# Patient Record
Sex: MALE | Race: WHITE | NOT HISPANIC OR LATINO | Employment: OTHER | URBAN - METROPOLITAN AREA
[De-identification: names, ages, dates, MRNs, and addresses within clinical notes are randomized per-mention and may not be internally consistent; named-entity substitution may affect disease eponyms.]

---

## 2024-03-19 ENCOUNTER — HOSPITAL ENCOUNTER (EMERGENCY)
Facility: CLINIC | Age: 74
Discharge: HOME | End: 2024-03-19
Attending: EMERGENCY MEDICINE | Admitting: EMERGENCY MEDICINE
Payer: COMMERCIAL

## 2024-03-19 ENCOUNTER — APPOINTMENT (OUTPATIENT)
Dept: CT IMAGING | Facility: CLINIC | Age: 74
End: 2024-03-19
Attending: EMERGENCY MEDICINE
Payer: COMMERCIAL

## 2024-03-19 VITALS
OXYGEN SATURATION: 94 % | TEMPERATURE: 97.4 F | DIASTOLIC BLOOD PRESSURE: 79 MMHG | RESPIRATION RATE: 10 BRPM | SYSTOLIC BLOOD PRESSURE: 119 MMHG | HEART RATE: 89 BPM

## 2024-03-19 DIAGNOSIS — F10.929 ALCOHOLIC INTOXICATION WITH COMPLICATION (H): ICD-10-CM

## 2024-03-19 LAB
ALBUMIN SERPL BCG-MCNC: 4.5 G/DL (ref 3.5–5.2)
ALP SERPL-CCNC: 94 U/L (ref 40–150)
ALT SERPL W P-5'-P-CCNC: 84 U/L (ref 0–70)
ANION GAP SERPL CALCULATED.3IONS-SCNC: 13 MMOL/L (ref 7–15)
AST SERPL W P-5'-P-CCNC: 109 U/L (ref 0–45)
BASOPHILS # BLD AUTO: 0 10E3/UL (ref 0–0.2)
BASOPHILS NFR BLD AUTO: 0 %
BILIRUB SERPL-MCNC: 0.3 MG/DL
BUN SERPL-MCNC: 8.1 MG/DL (ref 8–23)
CALCIUM SERPL-MCNC: 8.9 MG/DL (ref 8.8–10.2)
CHLORIDE SERPL-SCNC: 97 MMOL/L (ref 98–107)
CREAT SERPL-MCNC: 1 MG/DL (ref 0.67–1.17)
DEPRECATED HCO3 PLAS-SCNC: 24 MMOL/L (ref 22–29)
EGFRCR SERPLBLD CKD-EPI 2021: 80 ML/MIN/1.73M2
EOSINOPHIL # BLD AUTO: 0.1 10E3/UL (ref 0–0.7)
EOSINOPHIL NFR BLD AUTO: 2 %
ERYTHROCYTE [DISTWIDTH] IN BLOOD BY AUTOMATED COUNT: 13.8 % (ref 10–15)
ETHANOL SERPL-MCNC: 0.37 G/DL
GLUCOSE SERPL-MCNC: 114 MG/DL (ref 70–99)
HCT VFR BLD AUTO: 41.1 % (ref 40–53)
HGB BLD-MCNC: 14.1 G/DL (ref 13.3–17.7)
HOLD SPECIMEN: NORMAL
IMM GRANULOCYTES # BLD: 0 10E3/UL
IMM GRANULOCYTES NFR BLD: 0 %
LIPASE SERPL-CCNC: 83 U/L (ref 13–60)
LYMPHOCYTES # BLD AUTO: 1.5 10E3/UL (ref 0.8–5.3)
LYMPHOCYTES NFR BLD AUTO: 29 %
MCH RBC QN AUTO: 31.3 PG (ref 26.5–33)
MCHC RBC AUTO-ENTMCNC: 34.3 G/DL (ref 31.5–36.5)
MCV RBC AUTO: 91 FL (ref 78–100)
MONOCYTES # BLD AUTO: 0.9 10E3/UL (ref 0–1.3)
MONOCYTES NFR BLD AUTO: 17 %
NEUTROPHILS # BLD AUTO: 2.7 10E3/UL (ref 1.6–8.3)
NEUTROPHILS NFR BLD AUTO: 52 %
NRBC # BLD AUTO: 0 10E3/UL
NRBC BLD AUTO-RTO: 0 /100
PLATELET # BLD AUTO: 260 10E3/UL (ref 150–450)
POTASSIUM SERPL-SCNC: 4 MMOL/L (ref 3.4–5.3)
PROT SERPL-MCNC: 8 G/DL (ref 6.4–8.3)
RBC # BLD AUTO: 4.5 10E6/UL (ref 4.4–5.9)
SODIUM SERPL-SCNC: 134 MMOL/L (ref 135–145)
WBC # BLD AUTO: 5.2 10E3/UL (ref 4–11)

## 2024-03-19 PROCEDURE — 70450 CT HEAD/BRAIN W/O DYE: CPT

## 2024-03-19 PROCEDURE — 85025 COMPLETE CBC W/AUTO DIFF WBC: CPT | Performed by: EMERGENCY MEDICINE

## 2024-03-19 PROCEDURE — 83690 ASSAY OF LIPASE: CPT | Performed by: EMERGENCY MEDICINE

## 2024-03-19 PROCEDURE — 80053 COMPREHEN METABOLIC PANEL: CPT | Performed by: EMERGENCY MEDICINE

## 2024-03-19 PROCEDURE — 99284 EMERGENCY DEPT VISIT MOD MDM: CPT | Mod: 25

## 2024-03-19 PROCEDURE — 82077 ASSAY SPEC XCP UR&BREATH IA: CPT | Performed by: EMERGENCY MEDICINE

## 2024-03-19 PROCEDURE — 36415 COLL VENOUS BLD VENIPUNCTURE: CPT | Performed by: EMERGENCY MEDICINE

## 2024-03-19 ASSESSMENT — ACTIVITIES OF DAILY LIVING (ADL)
ADLS_ACUITY_SCORE: 35
ADLS_ACUITY_SCORE: 35

## 2024-03-19 NOTE — ED NOTES
"Patient continues to talk to himself and sing song in the room, stating \"all the ladies want to  me, why is that\", sister says this is way more silly then them he is drunk and that he is goofy when he drinks but not like this.   "

## 2024-03-19 NOTE — ED TRIAGE NOTES
"Prior to this they were [place] the [employer] [place] and were she stated that he only had a drink and then stopped drinking and stated that he felt funny and started to slur his speech and feel off, chalking it up to sleep deprivation and alcohol.      Sister comes in with patient saying that he started \"45 minutes ago he started to act funny and was unable to recognizer her.  Neuros are intact, patient has no vision issues, no headache.  He is stating that he has had 5 large rum and coke, LARGE ones, endorses ETOH beliefs, sister says that he was slurring his speech, patient stated that he is drunk and that is why, sister also says that he has only drink.    "

## 2024-03-19 NOTE — ED PROVIDER NOTES
History     Chief Complaint:  Stroke Symptoms       HPI   Altaf Nixon is a 73 year old male presents from triage with concerns for possible stroke although patient states that he has been drinking too much for [employer] and feels that he is just intoxicated.  Sister is not here and the phone number on record is incomplete son waiting to get more information from her.  Patient notes that he drinks on a regular basis.  He had 5 rum and Cokes today that were double sized.  By report he did not recognize his sister and was acting silly.  Triage nurse did not notice any focal deficits but brought him back to evaluate for stroke symptoms.      Independent Historian:    Patient and sister    Review of External Notes:  Chart review    Medications:    No current outpatient medications on file.      Past Medical History:    No past medical history on file.    Past Surgical History:    No past surgical history on file.       Physical Exam   Patient Vitals for the past 24 hrs:   BP Temp Temp src Pulse Resp SpO2   03/19/24 1931 -- -- -- 89 -- --   03/19/24 1930 119/79 -- -- 91 -- --   03/19/24 1900 122/78 -- -- 91 10 94 %   03/19/24 1845 -- -- -- 92 20 98 %   03/19/24 1840 (!) 146/101 97.4  F (36.3  C) Axillary 96 17 96 %        Physical Exam  GENERAL: well developed, pleasant, jovial appears intoxicated  HEAD: atraumatic  EYES: pupils reactive, extraocular muscles intact, conjunctivae normal  ENT:  mucus membranes moist  NECK:  trachea midline, normal range of motion  RESPIRATORY: no tachypnea, breath sounds clear to auscultation   CVS: normal S1/S2, no murmurs, intact distal pulses  ABDOMEN: soft, nontender, nondistention  MUSCULOSKELETAL: no deformities  SKIN: warm and dry, no acute rashes or ulceration  NEURO: GCS 15, cranial nerves intact, alert and oriented x3, normal finger-to-nose  PSYCH:  Mood/affect normal      Emergency Department Course       Imaging:  CT Head w/o Contrast   Final Result    IMPRESSION:   1.  No acute intracranial process.          Laboratory:  Labs Ordered and Resulted from Time of ED Arrival to Time of ED Departure   COMPREHENSIVE METABOLIC PANEL - Abnormal       Result Value    Sodium 134 (*)     Potassium 4.0      Carbon Dioxide (CO2) 24      Anion Gap 13      Urea Nitrogen 8.1      Creatinine 1.00      GFR Estimate 80      Calcium 8.9      Chloride 97 (*)     Glucose 114 (*)     Alkaline Phosphatase 94       (*)     ALT 84 (*)     Protein Total 8.0      Albumin 4.5      Bilirubin Total 0.3     LIPASE - Abnormal    Lipase 83 (*)    ETHYL ALCOHOL LEVEL - Abnormal    Alcohol ethyl 0.37 (*)    CBC WITH PLATELETS AND DIFFERENTIAL    WBC Count 5.2      RBC Count 4.50      Hemoglobin 14.1      Hematocrit 41.1      MCV 91      MCH 31.3      MCHC 34.3      RDW 13.8      Platelet Count 260      % Neutrophils 52      % Lymphocytes 29      % Monocytes 17      % Eosinophils 2      % Basophils 0      % Immature Granulocytes 0      NRBCs per 100 WBC 0      Absolute Neutrophils 2.7      Absolute Lymphocytes 1.5      Absolute Monocytes 0.9      Absolute Eosinophils 0.1      Absolute Basophils 0.0      Absolute Immature Granulocytes 0.0      Absolute NRBCs 0.0          Procedures       Emergency Department Course & Assessments:    Interventions:  Medications - No data to display     Assessments:      Independent Interpretation (X-rays, CTs, rhythm strip):  None    Consultations/Discussion of Management or Tests:  None       Social Determinants of Health affecting care:  None     Disposition:  The patient was discharged.    Impression & Plan    CMS Diagnoses: None          Medical Decision Making:    Patient presents with sister who thinks she was having a stroke.  He had an episode where he did not recognize her and seemed off.  Patient notes that he has been drinking all day for same patties and is a chronic drinker and probably had too much to drink.  Here he has a nonfocal exam without  signs of a stroke.  Alcohol came back elevated at 0.37.  CT is negative for bleed or stroke.  He has a normal exam all looks to be consistent with alcohol intoxication do not feel we need to do perfusion scans or angio.    Critical Care time:  was 0 minutes for this patient excluding procedures.    Diagnosis:    ICD-10-CM    1. Alcoholic intoxication with complication (H24)  F10.929            Discharge Medications:  There are no discharge medications for this patient.         Héctor Castañeda MD  3/19/2024   Héctor Castañeda MD Adams, Shaun L, MD  03/19/24 5854

## 2024-05-22 ENCOUNTER — HOSPITAL ENCOUNTER (INPATIENT)
Facility: CLINIC | Age: 74
LOS: 2 days | Discharge: HOME | DRG: 897 | End: 2024-05-24
Attending: EMERGENCY MEDICINE | Admitting: HOSPITALIST
Payer: COMMERCIAL

## 2024-05-22 ENCOUNTER — OFFICE VISIT (OUTPATIENT)
Dept: URGENT CARE | Facility: URGENT CARE | Age: 74
End: 2024-05-22
Payer: COMMERCIAL

## 2024-05-22 VITALS
DIASTOLIC BLOOD PRESSURE: 103 MMHG | WEIGHT: 185 LBS | OXYGEN SATURATION: 97 % | TEMPERATURE: 99.6 F | HEART RATE: 125 BPM | SYSTOLIC BLOOD PRESSURE: 171 MMHG | RESPIRATION RATE: 20 BRPM

## 2024-05-22 DIAGNOSIS — T78.3XXA ANGIOEDEMA, INITIAL ENCOUNTER: ICD-10-CM

## 2024-05-22 DIAGNOSIS — E88.89 KETOSIS (H): ICD-10-CM

## 2024-05-22 DIAGNOSIS — I10 BENIGN ESSENTIAL HYPERTENSION: Primary | ICD-10-CM

## 2024-05-22 DIAGNOSIS — R56.9 ALCOHOL WITHDRAWAL SEIZURE WITHOUT COMPLICATION (H): ICD-10-CM

## 2024-05-22 DIAGNOSIS — R22.0 SWELLING OF BOTH LIPS: Primary | ICD-10-CM

## 2024-05-22 DIAGNOSIS — F10.930 ALCOHOL WITHDRAWAL SEIZURE WITHOUT COMPLICATION (H): ICD-10-CM

## 2024-05-22 LAB
ABO/RH(D): NORMAL
ALBUMIN SERPL BCG-MCNC: 4.5 G/DL (ref 3.5–5.2)
ALP SERPL-CCNC: 96 U/L (ref 40–150)
ALT SERPL W P-5'-P-CCNC: 90 U/L (ref 0–70)
ANION GAP SERPL CALCULATED.3IONS-SCNC: 20 MMOL/L (ref 7–15)
ANTIBODY SCREEN: NEGATIVE
AST SERPL W P-5'-P-CCNC: 126 U/L (ref 0–45)
ATRIAL RATE - MUSE: 114 BPM
B-OH-BUTYR SERPL-SCNC: 1.9 MMOL/L
BASOPHILS # BLD AUTO: 0 10E3/UL (ref 0–0.2)
BASOPHILS NFR BLD AUTO: 0 %
BILIRUB SERPL-MCNC: 0.8 MG/DL
BLD PROD TYP BPU: NORMAL
BLOOD COMPONENT TYPE: NORMAL
BUN SERPL-MCNC: 7 MG/DL (ref 8–23)
CALCIUM SERPL-MCNC: 9.7 MG/DL (ref 8.8–10.2)
CHLORIDE SERPL-SCNC: 93 MMOL/L (ref 98–107)
CODING SYSTEM: NORMAL
CREAT SERPL-MCNC: 0.91 MG/DL (ref 0.67–1.17)
DEPRECATED HCO3 PLAS-SCNC: 18 MMOL/L (ref 22–29)
DIASTOLIC BLOOD PRESSURE - MUSE: NORMAL MMHG
EGFRCR SERPLBLD CKD-EPI 2021: 90 ML/MIN/1.73M2
EOSINOPHIL # BLD AUTO: 0 10E3/UL (ref 0–0.7)
EOSINOPHIL NFR BLD AUTO: 0 %
ERYTHROCYTE [DISTWIDTH] IN BLOOD BY AUTOMATED COUNT: 13.3 % (ref 10–15)
ETHANOL SERPL-MCNC: 0.01 G/DL
GLUCOSE BLDC GLUCOMTR-MCNC: 162 MG/DL (ref 70–99)
GLUCOSE SERPL-MCNC: 101 MG/DL (ref 70–99)
HCT VFR BLD AUTO: 45.1 % (ref 40–53)
HGB BLD-MCNC: 15.6 G/DL (ref 13.3–17.7)
IMM GRANULOCYTES # BLD: 0 10E3/UL
IMM GRANULOCYTES NFR BLD: 0 %
INTERPRETATION ECG - MUSE: NORMAL
ISSUE DATE AND TIME: NORMAL
LYMPHOCYTES # BLD AUTO: 1 10E3/UL (ref 0.8–5.3)
LYMPHOCYTES NFR BLD AUTO: 17 %
MAGNESIUM SERPL-MCNC: 1.8 MG/DL (ref 1.7–2.3)
MAGNESIUM SERPL-MCNC: 1.8 MG/DL (ref 1.7–2.3)
MCH RBC QN AUTO: 32.2 PG (ref 26.5–33)
MCHC RBC AUTO-ENTMCNC: 34.6 G/DL (ref 31.5–36.5)
MCV RBC AUTO: 93 FL (ref 78–100)
MONOCYTES # BLD AUTO: 0.6 10E3/UL (ref 0–1.3)
MONOCYTES NFR BLD AUTO: 11 %
NEUTROPHILS # BLD AUTO: 4 10E3/UL (ref 1.6–8.3)
NEUTROPHILS NFR BLD AUTO: 72 %
NRBC # BLD AUTO: 0 10E3/UL
NRBC BLD AUTO-RTO: 0 /100
P AXIS - MUSE: 66 DEGREES
PHOSPHATE SERPL-MCNC: 2.5 MG/DL (ref 2.5–4.5)
PLATELET # BLD AUTO: 238 10E3/UL (ref 150–450)
POTASSIUM SERPL-SCNC: 3.9 MMOL/L (ref 3.4–5.3)
PR INTERVAL - MUSE: 142 MS
PROT SERPL-MCNC: 8.3 G/DL (ref 6.4–8.3)
QRS DURATION - MUSE: 96 MS
QT - MUSE: 322 MS
QTC - MUSE: 443 MS
R AXIS - MUSE: 2 DEGREES
RBC # BLD AUTO: 4.85 10E6/UL (ref 4.4–5.9)
SODIUM SERPL-SCNC: 131 MMOL/L (ref 135–145)
SPECIMEN EXPIRATION DATE: NORMAL
SYSTOLIC BLOOD PRESSURE - MUSE: NORMAL MMHG
T AXIS - MUSE: 63 DEGREES
TSH SERPL DL<=0.005 MIU/L-ACNC: 3.88 UIU/ML (ref 0.3–4.2)
UNIT ABO/RH: NORMAL
UNIT NUMBER: NORMAL
UNIT STATUS: NORMAL
UNIT TYPE ISBT: 6200
VENTRICULAR RATE- MUSE: 114 BPM
WBC # BLD AUTO: 5.7 10E3/UL (ref 4–11)

## 2024-05-22 PROCEDURE — 36430 TRANSFUSION BLD/BLD COMPNT: CPT

## 2024-05-22 PROCEDURE — 250N000009 HC RX 250: Performed by: EMERGENCY MEDICINE

## 2024-05-22 PROCEDURE — HZ2ZZZZ DETOXIFICATION SERVICES FOR SUBSTANCE ABUSE TREATMENT: ICD-10-PCS | Performed by: HOSPITALIST

## 2024-05-22 PROCEDURE — 36415 COLL VENOUS BLD VENIPUNCTURE: CPT | Performed by: HOSPITALIST

## 2024-05-22 PROCEDURE — P9059 PLASMA, FRZ BETWEEN 8-24HOUR: HCPCS | Performed by: EMERGENCY MEDICINE

## 2024-05-22 PROCEDURE — 99285 EMERGENCY DEPT VISIT HI MDM: CPT | Mod: 25

## 2024-05-22 PROCEDURE — 250N000013 HC RX MED GY IP 250 OP 250 PS 637: Performed by: EMERGENCY MEDICINE

## 2024-05-22 PROCEDURE — 96374 THER/PROPH/DIAG INJ IV PUSH: CPT

## 2024-05-22 PROCEDURE — 258N000003 HC RX IP 258 OP 636: Performed by: EMERGENCY MEDICINE

## 2024-05-22 PROCEDURE — 36415 COLL VENOUS BLD VENIPUNCTURE: CPT | Performed by: EMERGENCY MEDICINE

## 2024-05-22 PROCEDURE — 85025 COMPLETE CBC W/AUTO DIFF WBC: CPT | Performed by: EMERGENCY MEDICINE

## 2024-05-22 PROCEDURE — 93005 ELECTROCARDIOGRAM TRACING: CPT

## 2024-05-22 PROCEDURE — 82010 KETONE BODYS QUAN: CPT | Performed by: EMERGENCY MEDICINE

## 2024-05-22 PROCEDURE — 3E043XZ INTRODUCTION OF VASOPRESSOR INTO CENTRAL VEIN, PERCUTANEOUS APPROACH: ICD-10-PCS | Performed by: HOSPITALIST

## 2024-05-22 PROCEDURE — 84443 ASSAY THYROID STIM HORMONE: CPT | Performed by: EMERGENCY MEDICINE

## 2024-05-22 PROCEDURE — 96376 TX/PRO/DX INJ SAME DRUG ADON: CPT

## 2024-05-22 PROCEDURE — 83735 ASSAY OF MAGNESIUM: CPT | Performed by: EMERGENCY MEDICINE

## 2024-05-22 PROCEDURE — 80053 COMPREHEN METABOLIC PANEL: CPT | Performed by: EMERGENCY MEDICINE

## 2024-05-22 PROCEDURE — 96372 THER/PROPH/DIAG INJ SC/IM: CPT | Mod: 59 | Performed by: PHYSICIAN ASSISTANT

## 2024-05-22 PROCEDURE — 86900 BLOOD TYPING SEROLOGIC ABO: CPT | Performed by: EMERGENCY MEDICINE

## 2024-05-22 PROCEDURE — 250N000011 HC RX IP 250 OP 636: Performed by: EMERGENCY MEDICINE

## 2024-05-22 PROCEDURE — 96361 HYDRATE IV INFUSION ADD-ON: CPT

## 2024-05-22 PROCEDURE — 258N000003 HC RX IP 258 OP 636: Performed by: HOSPITALIST

## 2024-05-22 PROCEDURE — 96375 TX/PRO/DX INJ NEW DRUG ADDON: CPT

## 2024-05-22 PROCEDURE — 99222 1ST HOSP IP/OBS MODERATE 55: CPT | Performed by: HOSPITALIST

## 2024-05-22 PROCEDURE — 82077 ASSAY SPEC XCP UR&BREATH IA: CPT | Performed by: EMERGENCY MEDICINE

## 2024-05-22 PROCEDURE — 120N000013 HC R&B IMCU

## 2024-05-22 PROCEDURE — 99205 OFFICE O/P NEW HI 60 MIN: CPT | Mod: 25 | Performed by: PHYSICIAN ASSISTANT

## 2024-05-22 PROCEDURE — 83735 ASSAY OF MAGNESIUM: CPT | Performed by: HOSPITALIST

## 2024-05-22 PROCEDURE — 250N000013 HC RX MED GY IP 250 OP 250 PS 637: Performed by: HOSPITALIST

## 2024-05-22 PROCEDURE — 84100 ASSAY OF PHOSPHORUS: CPT | Performed by: HOSPITALIST

## 2024-05-22 PROCEDURE — 250N000011 HC RX IP 250 OP 636: Performed by: HOSPITALIST

## 2024-05-22 RX ORDER — DIAZEPAM 10 MG/2ML
5 INJECTION, SOLUTION INTRAMUSCULAR; INTRAVENOUS ONCE
Status: COMPLETED | OUTPATIENT
Start: 2024-05-22 | End: 2024-05-22

## 2024-05-22 RX ORDER — FOLIC ACID 1 MG/1
1 TABLET ORAL DAILY
Status: DISCONTINUED | OUTPATIENT
Start: 2024-05-23 | End: 2024-05-24 | Stop reason: HOSPADM

## 2024-05-22 RX ORDER — ACETAMINOPHEN 650 MG/1
650 SUPPOSITORY RECTAL EVERY 4 HOURS PRN
Status: DISCONTINUED | OUTPATIENT
Start: 2024-05-22 | End: 2024-05-24 | Stop reason: HOSPADM

## 2024-05-22 RX ORDER — HALOPERIDOL 5 MG/ML
2.5-5 INJECTION INTRAMUSCULAR EVERY 6 HOURS PRN
Status: DISCONTINUED | OUTPATIENT
Start: 2024-05-22 | End: 2024-05-24 | Stop reason: HOSPADM

## 2024-05-22 RX ORDER — DIAZEPAM 5 MG
10 TABLET ORAL EVERY 30 MIN PRN
Status: DISCONTINUED | OUTPATIENT
Start: 2024-05-22 | End: 2024-05-24 | Stop reason: HOSPADM

## 2024-05-22 RX ORDER — MULTIPLE VITAMINS W/ MINERALS TAB 9MG-400MCG
1 TAB ORAL DAILY
Status: DISCONTINUED | OUTPATIENT
Start: 2024-05-23 | End: 2024-05-24 | Stop reason: HOSPADM

## 2024-05-22 RX ORDER — MULTIPLE VITAMINS W/ MINERALS TAB 9MG-400MCG
1 TAB ORAL DAILY
Status: DISCONTINUED | OUTPATIENT
Start: 2024-05-22 | End: 2024-05-22

## 2024-05-22 RX ORDER — CALCIUM CARBONATE 500 MG/1
1000 TABLET, CHEWABLE ORAL 4 TIMES DAILY PRN
Status: DISCONTINUED | OUTPATIENT
Start: 2024-05-22 | End: 2024-05-24 | Stop reason: HOSPADM

## 2024-05-22 RX ORDER — AMOXICILLIN 250 MG
1 CAPSULE ORAL 2 TIMES DAILY PRN
Status: DISCONTINUED | OUTPATIENT
Start: 2024-05-22 | End: 2024-05-24 | Stop reason: HOSPADM

## 2024-05-22 RX ORDER — SODIUM CHLORIDE 9 MG/ML
INJECTION, SOLUTION INTRAVENOUS CONTINUOUS
Status: DISCONTINUED | OUTPATIENT
Start: 2024-05-22 | End: 2024-05-24 | Stop reason: HOSPADM

## 2024-05-22 RX ORDER — FOLIC ACID 1 MG/1
1 TABLET ORAL DAILY
Status: DISCONTINUED | OUTPATIENT
Start: 2024-05-22 | End: 2024-05-22

## 2024-05-22 RX ORDER — AMOXICILLIN 250 MG
2 CAPSULE ORAL 2 TIMES DAILY PRN
Status: DISCONTINUED | OUTPATIENT
Start: 2024-05-22 | End: 2024-05-24 | Stop reason: HOSPADM

## 2024-05-22 RX ORDER — FLUMAZENIL 0.1 MG/ML
0.2 INJECTION, SOLUTION INTRAVENOUS
Status: DISCONTINUED | OUTPATIENT
Start: 2024-05-22 | End: 2024-05-24 | Stop reason: HOSPADM

## 2024-05-22 RX ORDER — LIDOCAINE 40 MG/G
CREAM TOPICAL
Status: DISCONTINUED | OUTPATIENT
Start: 2024-05-22 | End: 2024-05-22

## 2024-05-22 RX ORDER — DIAZEPAM 10 MG/2ML
5-10 INJECTION, SOLUTION INTRAMUSCULAR; INTRAVENOUS EVERY 30 MIN PRN
Status: DISCONTINUED | OUTPATIENT
Start: 2024-05-22 | End: 2024-05-22

## 2024-05-22 RX ORDER — SIMVASTATIN 20 MG
20 TABLET ORAL AT BEDTIME
Status: ON HOLD | COMMUNITY
Start: 2023-08-21 | End: 2024-05-24

## 2024-05-22 RX ORDER — DIAZEPAM 10 MG/2ML
5-10 INJECTION, SOLUTION INTRAMUSCULAR; INTRAVENOUS EVERY 30 MIN PRN
Status: DISCONTINUED | OUTPATIENT
Start: 2024-05-22 | End: 2024-05-24 | Stop reason: HOSPADM

## 2024-05-22 RX ORDER — METHYLPREDNISOLONE SODIUM SUCCINATE 125 MG/2ML
60 INJECTION, POWDER, LYOPHILIZED, FOR SOLUTION INTRAMUSCULAR; INTRAVENOUS ONCE
Status: COMPLETED | OUTPATIENT
Start: 2024-05-22 | End: 2024-05-22

## 2024-05-22 RX ORDER — MULTIVIT WITH MINERALS/LUTEIN
1 TABLET ORAL DAILY
COMMUNITY

## 2024-05-22 RX ORDER — ACETAMINOPHEN 325 MG/1
650 TABLET ORAL EVERY 4 HOURS PRN
Status: DISCONTINUED | OUTPATIENT
Start: 2024-05-22 | End: 2024-05-24 | Stop reason: HOSPADM

## 2024-05-22 RX ORDER — LIDOCAINE 40 MG/G
CREAM TOPICAL
Status: DISCONTINUED | OUTPATIENT
Start: 2024-05-22 | End: 2024-05-24 | Stop reason: HOSPADM

## 2024-05-22 RX ORDER — DIPHENHYDRAMINE HYDROCHLORIDE 50 MG/ML
50 INJECTION INTRAMUSCULAR; INTRAVENOUS ONCE
Status: DISCONTINUED | OUTPATIENT
Start: 2024-05-22 | End: 2024-05-22

## 2024-05-22 RX ORDER — OLANZAPINE 5 MG/1
5-10 TABLET, ORALLY DISINTEGRATING ORAL EVERY 6 HOURS PRN
Status: DISCONTINUED | OUTPATIENT
Start: 2024-05-22 | End: 2024-05-24 | Stop reason: HOSPADM

## 2024-05-22 RX ORDER — DIAZEPAM 5 MG
10 TABLET ORAL EVERY 30 MIN PRN
Status: DISCONTINUED | OUTPATIENT
Start: 2024-05-22 | End: 2024-05-22

## 2024-05-22 RX ORDER — AMLODIPINE BESYLATE AND BENAZEPRIL HYDROCHLORIDE 2.5; 1 MG/1; MG/1
1 CAPSULE ORAL DAILY
Status: ON HOLD | COMMUNITY
End: 2024-05-23

## 2024-05-22 RX ORDER — METHYLPREDNISOLONE SODIUM SUCCINATE 40 MG/ML
40 INJECTION, POWDER, LYOPHILIZED, FOR SOLUTION INTRAMUSCULAR; INTRAVENOUS ONCE
Status: DISCONTINUED | OUTPATIENT
Start: 2024-05-22 | End: 2024-05-22

## 2024-05-22 RX ORDER — CLONIDINE HYDROCHLORIDE 0.1 MG/1
0.1 TABLET ORAL EVERY 8 HOURS
Status: DISCONTINUED | OUTPATIENT
Start: 2024-05-22 | End: 2024-05-24 | Stop reason: HOSPADM

## 2024-05-22 RX ORDER — TRANEXAMIC ACID 10 MG/ML
1 INJECTION, SOLUTION INTRAVENOUS ONCE
Status: COMPLETED | OUTPATIENT
Start: 2024-05-22 | End: 2024-05-22

## 2024-05-22 RX ADMIN — SODIUM CHLORIDE 1000 ML: 9 INJECTION, SOLUTION INTRAVENOUS at 20:00

## 2024-05-22 RX ADMIN — DIPHENHYDRAMINE HYDROCHLORIDE 50 MG: 50 INJECTION INTRAMUSCULAR; INTRAVENOUS at 17:04

## 2024-05-22 RX ADMIN — SODIUM CHLORIDE 1000 ML: 9 INJECTION, SOLUTION INTRAVENOUS at 17:52

## 2024-05-22 RX ADMIN — EPINEPHRINE 0.3 MG: 1 INJECTION, SOLUTION, CONCENTRATE INTRAVENOUS at 17:49

## 2024-05-22 RX ADMIN — METHYLPREDNISOLONE SODIUM SUCCINATE 40 MG: 40 INJECTION, POWDER, LYOPHILIZED, FOR SOLUTION INTRAMUSCULAR; INTRAVENOUS at 17:08

## 2024-05-22 RX ADMIN — DIAZEPAM 5 MG: 5 INJECTION INTRAMUSCULAR; INTRAVENOUS at 18:10

## 2024-05-22 RX ADMIN — METHYLPREDNISOLONE SODIUM SUCCINATE 62.5 MG: 125 INJECTION, POWDER, FOR SOLUTION INTRAMUSCULAR; INTRAVENOUS at 17:46

## 2024-05-22 RX ADMIN — DIAZEPAM 5 MG: 5 INJECTION INTRAMUSCULAR; INTRAVENOUS at 18:38

## 2024-05-22 RX ADMIN — SODIUM CHLORIDE: 9 INJECTION, SOLUTION INTRAVENOUS at 22:54

## 2024-05-22 RX ADMIN — MULTIPLE VITAMINS W/ MINERALS TAB 1 TABLET: TAB at 19:37

## 2024-05-22 RX ADMIN — CLONIDINE HYDROCHLORIDE 0.1 MG: 0.1 TABLET ORAL at 21:35

## 2024-05-22 RX ADMIN — DIAZEPAM 5 MG: 5 INJECTION INTRAMUSCULAR; INTRAVENOUS at 17:42

## 2024-05-22 RX ADMIN — THIAMINE HCL TAB 100 MG 100 MG: 100 TAB at 19:37

## 2024-05-22 RX ADMIN — DIAZEPAM 10 MG: 5 INJECTION INTRAMUSCULAR; INTRAVENOUS at 19:42

## 2024-05-22 RX ADMIN — FOLIC ACID 1 MG: 1 TABLET ORAL at 19:37

## 2024-05-22 RX ADMIN — DIAZEPAM 10 MG: 5 INJECTION INTRAMUSCULAR; INTRAVENOUS at 21:36

## 2024-05-22 RX ADMIN — DIAZEPAM 10 MG: 5 TABLET ORAL at 23:04

## 2024-05-22 RX ADMIN — TRANEXAMIC ACID 1 G: 10 INJECTION, SOLUTION INTRAVENOUS at 17:54

## 2024-05-22 ASSESSMENT — LIFESTYLE VARIABLES
HEADACHE, FULLNESS IN HEAD: NOT PRESENT
ORIENTATION AND CLOUDING OF SENSORIUM: ORIENTED AND CAN DO SERIAL ADDITIONS
TACTILE DISTURBANCES: MILD ITCHING, PINS AND NEEDLES, BURNING OR NUMBNESS
ANXIETY: MILDLY ANXIOUS
TREMOR: MODERATE, WITH PATIENT'S ARMS EXTENDED
TOTAL SCORE: 7
AGITATION: SOMEWHAT MORE THAN NORMAL ACTIVITY
TREMOR: 6
HEADACHE, FULLNESS IN HEAD: NOT PRESENT
TOTAL SCORE: 12
AUDITORY DISTURBANCES: NOT PRESENT
AUDITORY DISTURBANCES: MILD HARSHNESS OR ABILITY TO FRIGHTEN
AGITATION: 2
VISUAL DISTURBANCES: NOT PRESENT
ANXIETY: MODERATELY ANXIOUS, OR GUARDED, SO ANXIETY IS INFERRED
ORIENTATION AND CLOUDING OF SENSORIUM: ORIENTED AND CAN DO SERIAL ADDITIONS
TACTILE DISTURBANCES: VERY MILD ITCHING, PINS AND NEEDLES, BURNING OR NUMBNESS
AUDITORY DISTURBANCES: NOT PRESENT
AGITATION: NORMAL ACTIVITY
VISUAL DISTURBANCES: NOT PRESENT
VISUAL DISTURBANCES: NOT PRESENT
TREMOR: MODERATE, WITH PATIENT'S ARMS EXTENDED
NAUSEA AND VOMITING: MILD NAUSEA WITH NO VOMITING
ORIENTATION AND CLOUDING OF SENSORIUM: ORIENTED AND CAN DO SERIAL ADDITIONS
HEADACHE, FULLNESS IN HEAD: NOT PRESENT
TOTAL SCORE: 8
ANXIETY: 3
NAUSEA AND VOMITING: NO NAUSEA AND NO VOMITING
TOTAL SCORE: 10
ORIENTATION AND CLOUDING OF SENSORIUM: ORIENTED AND CAN DO SERIAL ADDITIONS
HEADACHE, FULLNESS IN HEAD: NOT PRESENT
NAUSEA AND VOMITING: NO NAUSEA AND NO VOMITING
VISUAL DISTURBANCES: NOT PRESENT
AGITATION: NORMAL ACTIVITY
TOTAL SCORE: 16
AGITATION: SOMEWHAT MORE THAN NORMAL ACTIVITY
PAROXYSMAL SWEATS: BARELY PERCEPTIBLE SWEATING, PALMS MOIST
PAROXYSMAL SWEATS: 3
AUDITORY DISTURBANCES: NOT PRESENT
AGITATION: SOMEWHAT MORE THAN NORMAL ACTIVITY
TOTAL SCORE: 13
NAUSEA AND VOMITING: NO NAUSEA AND NO VOMITING
ANXIETY: 3
PAROXYSMAL SWEATS: NO SWEAT VISIBLE
HEADACHE, FULLNESS IN HEAD: NOT PRESENT
PAROXYSMAL SWEATS: BARELY PERCEPTIBLE SWEATING, PALMS MOIST
NAUSEA AND VOMITING: NO NAUSEA AND NO VOMITING
PAROXYSMAL SWEATS: NO SWEAT VISIBLE
TREMOR: MODERATE, WITH PATIENT'S ARMS EXTENDED
AUDITORY DISTURBANCES: NOT PRESENT
VISUAL DISTURBANCES: NOT PRESENT
HEADACHE, FULLNESS IN HEAD: NOT PRESENT
ANXIETY: 3
ORIENTATION AND CLOUDING OF SENSORIUM: ORIENTED AND CAN DO SERIAL ADDITIONS
NAUSEA AND VOMITING: NO NAUSEA AND NO VOMITING
ANXIETY: 2
TACTILE DISTURBANCES: VERY MILD ITCHING, PINS AND NEEDLES, BURNING OR NUMBNESS
VISUAL DISTURBANCES: NOT PRESENT
TREMOR: 5
ORIENTATION AND CLOUDING OF SENSORIUM: ORIENTED AND CAN DO SERIAL ADDITIONS
TACTILE DISTURBANCES: MILD ITCHING, PINS AND NEEDLES, BURNING OR NUMBNESS
AUDITORY DISTURBANCES: VERY MILD HARSHNESS OR ABILITY TO FRIGHTEN
PAROXYSMAL SWEATS: BARELY PERCEPTIBLE SWEATING, PALMS MOIST
TREMOR: 6

## 2024-05-22 ASSESSMENT — COLUMBIA-SUICIDE SEVERITY RATING SCALE - C-SSRS
1. IN THE PAST MONTH, HAVE YOU WISHED YOU WERE DEAD OR WISHED YOU COULD GO TO SLEEP AND NOT WAKE UP?: NO
6. HAVE YOU EVER DONE ANYTHING, STARTED TO DO ANYTHING, OR PREPARED TO DO ANYTHING TO END YOUR LIFE?: NO
2. HAVE YOU ACTUALLY HAD ANY THOUGHTS OF KILLING YOURSELF IN THE PAST MONTH?: NO

## 2024-05-22 ASSESSMENT — ACTIVITIES OF DAILY LIVING (ADL)
ADLS_ACUITY_SCORE: 35

## 2024-05-22 NOTE — PROGRESS NOTES
Clinic Administered Medication Documentation        Patient was given Diphenhydramine 50mg and Methylprednisolone 40mg in L anterior thigh. Prior to medication administration, verified patient's identity using patient s name and date of birth. Please see MAR and medication order for additional information. Patient went to ER via ambulance.    Vial/Syringe: Single dose vial. Was entire vial of medication used? Yes    Altaf Eagle RN BSN  [facility]

## 2024-05-22 NOTE — PROGRESS NOTES
Nurse roomed patient and notified provider of patient's condition. Provider Rico DAVIES assessed the patient and decided to send the patient by ambulance to the ER.     Altaf Eagle RN BSN  [facility]

## 2024-05-22 NOTE — PROGRESS NOTES
SUBJECTIVE:  Altaf Nixon is a 73 year old male who presents to the clinic today for swollen lips.  Onset of rash was 6 hours ago   Rash is gradual onset.  Location of the rash: lips.  Symptoms are moderate and rash seems to be worsening.  Previous history of a similar rash? No  Recent exposure history: Benazepril    Associated symptoms include: nothing.    No past medical history on file.  Current Outpatient Medications   Medication Sig Dispense Refill    amLODIPine-benazepril (LOTREL) 2.5-10 MG capsule Take 1 capsule by mouth daily      simvastatin (ZOCOR) 20 MG tablet 20 mg       Social History     Tobacco Use    Smoking status: Never    Smokeless tobacco: Never   Substance Use Topics    Alcohol use: Not on file       ROS:  Review of systems negative except as stated above.    EXAM:   BP (!) 171/103 (BP Location: Right arm, Patient Position: Sitting, Cuff Size: Adult Regular)   Pulse (!) 125   Temp 99.6  F (37.6  C) (Tympanic)   Resp 20   Wt 83.9 kg (185 lb)   SpO2 97%   GENERAL: alert, no acute distress.  ENT: Upper and lower lips significantly swollen  RESP: lungs clear to auscultation - no rales, rhonchi or wheezes  CV: regular rates and rhythm, normal S1 S2, no murmur noted  NEURO: Normal strength and tone, sensory exam grossly normal,  normal speech and mentation    ASSESSMENT:  (R22.0) Swelling of both lips  (primary encounter diagnosis)  (T78.3XXA) Angioedema, initial encounter  Comment: possibly response to medication  Plan: methylPREDNISolone sodium succinate         (solu-MEDROL) injection 40 mg, diphenhydrAMINE         (BENADRYL) injection 50 mg, INJECTION         INTRAMUSCULAR OR SUB-Q  To ED for monitoring via EMS

## 2024-05-22 NOTE — ED NOTES
Bed: ED19  Expected date:   Expected time:   Means of arrival:   Comments:  Frank Swain angiodema eta 6

## 2024-05-22 NOTE — ED PROVIDER NOTES
Emergency Department Note      History of Present Illness     Chief Complaint  Allergic Reaction    HPI  Altaf Nixon is a 73 year old male who presents to the ED via EMS for evaluation of an allergic reaction. The EMS reports that the patient woke up this morning with redness and swelling that began on his cheek and later spread to his whole face. Adds that the patient went to get Benadryl at the pharmacy earlier today, where he was directed to go to . When at  the patient was given  Benadryl and solu-Medrol IM around 1700, and brought via EMS to the ED. When at  his heart rate was 125 beats per minute, and between 110-120 beats per minute en route. The patient reports that he typically drinks about four drinks per day. The patient endorses eating a taco this morning that was left over from the prior night. The patient denies difficulty breathing, swallowing, GI symptoms, wheezing, or shortness of breath.     Independent Historian  EMS as detailed above.    Review of External Notes  I reviewed the office visit from Dr. Amin at urgent care.     Past Medical History   Medical History and Problem List  Chronic eczema   Diverticular disease, large intestine   Hypertension  Colonic polyps, with high grade dysplasia   High cholesterol       Medications  Lotrel   Zocor    Surgical History   No pertinent surgical history.     Physical Exam   Patient Vitals for the past 24 hrs:   BP Temp Temp src Pulse Resp SpO2   05/22/24 1900 (!) 152/95 -- -- (!) 123 25 92 %   05/22/24 1840 (!) 155/85 -- -- 118 -- 97 %   05/22/24 1839 -- 98.1  F (36.7  C) -- -- -- --   05/22/24 1830 (!) 160/90 -- -- 120 17 96 %   05/22/24 1806 (!) 157/94 -- -- (!) 123 21 97 %   05/22/24 1745 (!) 144/103 -- -- 116 19 98 %   05/22/24 1734 (!) 170/99 -- -- 118 13 97 %   05/22/24 1730 (!) 174/102 97.3  F (36.3  C) Oral (!) 130 16 96 %     Physical Exam  VS: Reviewed per above  HENT: Mucous membranes moist. Uvula midline, no tonsillar hypertrophy  nor asymmetry. Tolerating secretions, normal phonation. No nuchal rigidity.  There is evidence of moderate upper lip swelling.  No tongue swelling.  EYES: sclera anicteric  CV: Rate as noted, regular rhythm.   RESP: Effort normal. Breath sounds are normal bilaterally.  Abdomen: no focal tenderness.  no rebound or guarding.  NEURO: Alert, moving all extremities  MSK: No deformity of the extremities  SKIN: Warm and dry, mild facial and upper chest erythema.    Diagnostics   Lab Results   Labs Ordered and Resulted from Time of ED Arrival to Time of ED Departure   COMPREHENSIVE METABOLIC PANEL - Abnormal       Result Value    Sodium 131 (*)     Potassium 3.9      Carbon Dioxide (CO2) 18 (*)     Anion Gap 20 (*)     Urea Nitrogen 7.0 (*)     Creatinine 0.91      GFR Estimate 90      Calcium 9.7      Chloride 93 (*)     Glucose 101 (*)     Alkaline Phosphatase 96       (*)     ALT 90 (*)     Protein Total 8.3      Albumin 4.5      Bilirubin Total 0.8     KETONE BETA-HYDROXYBUTYRATE QUANTITATIVE, RAPID - Abnormal    Ketone (Beta-Hydroxybutyrate) Quantitative 1.90 (*)    ETHYL ALCOHOL LEVEL - Normal    Alcohol ethyl 0.01     MAGNESIUM - Normal    Magnesium 1.8     TSH WITH FREE T4 REFLEX - Normal    TSH 3.88     CBC WITH PLATELETS AND DIFFERENTIAL    WBC Count 5.7      RBC Count 4.85      Hemoglobin 15.6      Hematocrit 45.1      MCV 93      MCH 32.2      MCHC 34.6      RDW 13.3      Platelet Count 238      % Neutrophils 72      % Lymphocytes 17      % Monocytes 11      % Eosinophils 0      % Basophils 0      % Immature Granulocytes 0      NRBCs per 100 WBC 0      Absolute Neutrophils 4.0      Absolute Lymphocytes 1.0      Absolute Monocytes 0.6      Absolute Eosinophils 0.0      Absolute Basophils 0.0      Absolute Immature Granulocytes 0.0      Absolute NRBCs 0.0     MAGNESIUM   TYPE AND SCREEN, ADULT    ABO/RH(D) O POS      Antibody Screen Negative      SPECIMEN EXPIRATION DATE [id number]     PREPARE PLASMA  (UNIT)    Blood Component Type Plasma      Product Code [id number]      Unit Status Transfused      Unit Number [id number]      CODING SYSTEM XLXH670      ISSUE DATE AND TIME [id number]      UNIT ABO/RH A+      UNIT TYPE ISBT 6200     PREPARE PLASMA (UNIT)   TRANSFUSE PLASMA (UNIT)   ABO/RH TYPE AND SCREEN             EKG   ECG taken at 1743, ECG read at 1751  Sinus tachycardia   No change as compared to prior, dated 05/22/24.  Rate 114 bpm. DC interval 142 ms. QRS duration 96 ms. QT/QTc 322/443 ms. P-R-T axes 66 2 63.      ED Course    Medications Administered  Medications   diazepam (VALIUM) tablet 10 mg ( Oral See Alternative 5/22/24 1942)     Or   diazepam (VALIUM) injection 5-10 mg (10 mg Intravenous $Given 5/22/24 1942)   thiamine (B-1) tablet 100 mg (100 mg Oral $Given 5/22/24 1937)   folic acid (FOLVITE) tablet 1 mg (1 mg Oral $Given 5/22/24 1937)   multivitamin w/minerals (THERA-VIT-M) tablet 1 tablet (1 tablet Oral $Given 5/22/24 1937)   sodium chloride 0.9% BOLUS 1,000 mL (has no administration in time range)   diazepam (VALIUM) injection 5 mg (5 mg Intravenous $Given 5/22/24 1742)   tranexamic acid 1 g in 100 mL NS IV bag (premix) (1 g Intravenous $Given 5/22/24 1754)   EPINEPHrine (ADRENALIN) kit 0.3 mg (0.3 mg Intramuscular $Given 5/22/24 1749)   sodium chloride 0.9% BOLUS 1,000 mL (0 mLs Intravenous Stopped 5/22/24 1852)   methylPREDNISolone sodium succinate (solu-MEDROL) injection 62.5 mg (62.5 mg Intravenous $Given 5/22/24 1746)   diazepam (VALIUM) injection 5 mg (5 mg Intravenous $Given 5/22/24 1810)   diazepam (VALIUM) injection 5 mg (5 mg Intravenous $Given 5/22/24 1838)       ED Course  ED Course as of 05/22/24 2000   Wed May 22, 2024   1732 I obtained history and examined the patient as noted above.    1752 I rechecked and updated the patient.    1816 Prepare plasma (unit), 1 Units   1818 I spoke to the blood bank regarding the condition of the patient.    1834 I rechecked and updated the  patient.    1909 I rechecked and updated the patient.      Medical Decision Making / Diagnosis     [facility]  Altaf Nixon is a 73 year old male who presents to the ER for evaluation of lip swelling.  On arrival he is tachycardic and tremulous.  He has upper lip swelling.  He also has facial and upper chest erythema.    I reviewed the urgent care note and he received IM Benadryl and Solu-Medrol but no epinephrine.  He does drink alcohol regularly.  At this time, tachycardia and tremulousness was attributed to alcohol withdrawal rather than side effect of medications received prior to arrival.    With respect to his lip swelling, the this certainly could be ACE inhibitor induced angioedema.  With associated facial and upper chest erythema I also considered histamine induced reaction.  He was treated with further steroids and IM epinephrine for possible histamine reaction as well as TXA and FFP for possible bradykinin type angioedema.  Angioedema remained stable while in the ER.  Patient reported that subjectively it was improving.    With respect to previously discussed alcohol withdrawal, he received many doses of IV Valium here in the ER.   Labs did reveal evidence of anion gap acidosis which was attributed to elevated ketones, thought due to alcohol misuse and dehydration.  He was given IV fluids as well as oral vitamins. He was admitted to hospital service for further management of alcohol withdrawal.     Disposition  The patient was admitted to the hospital- Dr mo    ICD-10 Codes:    ICD-10-CM    1. Alcohol withdrawal seizure without complication (H)  F10.930     R56.9       2. Angioedema, initial encounter  T78.3XXA       3. Ketosis (H)  E88.89            Discharge Medications  New Prescriptions    No medications on file         Scribe Disclosure:  I, Leola Butler, am serving as a scribe at 6:15 PM on 5/22/2024 to document services personally performed by Jamey Ferguson MD based on my observations  and the provider's statements to me.        Jamey Ferguson MD  05/22/24 2002

## 2024-05-22 NOTE — ED NOTES
DATE/TIME OF CALL RECEIVED FROM LAB:  05/22/24 at 6:56 PM   LAB TEST:  Ketone    LAB VALUE:  1.9  PROVIDER NOTIFIED?: Yes  PROVIDER NAME: Phyllis  DATE/TIME LAB VALUE REPORTED TO PROVIDER: 05/22/24 @1857  MECHANISM OF PROVIDER NOTIFICATION: Face-To-Face  PROVIDER RESPONSE: Aware

## 2024-05-23 LAB
ALBUMIN SERPL BCG-MCNC: 3.9 G/DL (ref 3.5–5.2)
ALP SERPL-CCNC: 75 U/L (ref 40–150)
ALT SERPL W P-5'-P-CCNC: 60 U/L (ref 0–70)
ANION GAP SERPL CALCULATED.3IONS-SCNC: 12 MMOL/L (ref 7–15)
AST SERPL W P-5'-P-CCNC: 67 U/L (ref 0–45)
BASOPHILS # BLD AUTO: 0 10E3/UL (ref 0–0.2)
BASOPHILS NFR BLD AUTO: 0 %
BILIRUB SERPL-MCNC: 0.7 MG/DL
BUN SERPL-MCNC: 8.9 MG/DL (ref 8–23)
CALCIUM SERPL-MCNC: 8.7 MG/DL (ref 8.8–10.2)
CHLORIDE SERPL-SCNC: 98 MMOL/L (ref 98–107)
CREAT SERPL-MCNC: 0.77 MG/DL (ref 0.67–1.17)
DEPRECATED HCO3 PLAS-SCNC: 23 MMOL/L (ref 22–29)
EGFRCR SERPLBLD CKD-EPI 2021: >90 ML/MIN/1.73M2
EOSINOPHIL # BLD AUTO: 0 10E3/UL (ref 0–0.7)
EOSINOPHIL NFR BLD AUTO: 0 %
ERYTHROCYTE [DISTWIDTH] IN BLOOD BY AUTOMATED COUNT: 13.3 % (ref 10–15)
GLUCOSE SERPL-MCNC: 170 MG/DL (ref 70–99)
HCT VFR BLD AUTO: 37.8 % (ref 40–53)
HGB BLD-MCNC: 13.2 G/DL (ref 13.3–17.7)
IMM GRANULOCYTES # BLD: 0 10E3/UL
IMM GRANULOCYTES NFR BLD: 0 %
LYMPHOCYTES # BLD AUTO: 0.2 10E3/UL (ref 0.8–5.3)
LYMPHOCYTES NFR BLD AUTO: 8 %
MAGNESIUM SERPL-MCNC: 1.9 MG/DL (ref 1.7–2.3)
MCH RBC QN AUTO: 32 PG (ref 26.5–33)
MCHC RBC AUTO-ENTMCNC: 34.9 G/DL (ref 31.5–36.5)
MCV RBC AUTO: 92 FL (ref 78–100)
MONOCYTES # BLD AUTO: 0 10E3/UL (ref 0–1.3)
MONOCYTES NFR BLD AUTO: 1 %
NEUTROPHILS # BLD AUTO: 2.4 10E3/UL (ref 1.6–8.3)
NEUTROPHILS NFR BLD AUTO: 91 %
NRBC # BLD AUTO: 0 10E3/UL
NRBC BLD AUTO-RTO: 0 /100
PLATELET # BLD AUTO: 192 10E3/UL (ref 150–450)
POTASSIUM SERPL-SCNC: 4.3 MMOL/L (ref 3.4–5.3)
PROT SERPL-MCNC: 7 G/DL (ref 6.4–8.3)
RBC # BLD AUTO: 4.13 10E6/UL (ref 4.4–5.9)
SODIUM SERPL-SCNC: 133 MMOL/L (ref 135–145)
WBC # BLD AUTO: 2.6 10E3/UL (ref 4–11)

## 2024-05-23 PROCEDURE — 85025 COMPLETE CBC W/AUTO DIFF WBC: CPT | Performed by: HOSPITALIST

## 2024-05-23 PROCEDURE — 36415 COLL VENOUS BLD VENIPUNCTURE: CPT | Performed by: HOSPITALIST

## 2024-05-23 PROCEDURE — 250N000013 HC RX MED GY IP 250 OP 250 PS 637: Performed by: INTERNAL MEDICINE

## 2024-05-23 PROCEDURE — 99233 SBSQ HOSP IP/OBS HIGH 50: CPT | Performed by: INTERNAL MEDICINE

## 2024-05-23 PROCEDURE — 83735 ASSAY OF MAGNESIUM: CPT | Performed by: HOSPITALIST

## 2024-05-23 PROCEDURE — 250N000013 HC RX MED GY IP 250 OP 250 PS 637: Performed by: HOSPITALIST

## 2024-05-23 PROCEDURE — 120N000001 HC R&B MED SURG/OB

## 2024-05-23 PROCEDURE — 84155 ASSAY OF PROTEIN SERUM: CPT | Performed by: HOSPITALIST

## 2024-05-23 PROCEDURE — 258N000003 HC RX IP 258 OP 636: Performed by: HOSPITALIST

## 2024-05-23 RX ORDER — AMLODIPINE BESYLATE 5 MG/1
5 TABLET ORAL DAILY
Status: DISCONTINUED | OUTPATIENT
Start: 2024-05-23 | End: 2024-05-24

## 2024-05-23 RX ORDER — AMLODIPINE BESYLATE 5 MG/1
5 TABLET ORAL DAILY
Qty: 30 TABLET | Refills: 0 | Status: SHIPPED | OUTPATIENT
Start: 2024-05-23 | End: 2024-05-24

## 2024-05-23 RX ORDER — LABETALOL HYDROCHLORIDE 5 MG/ML
10 INJECTION, SOLUTION INTRAVENOUS
Status: DISCONTINUED | OUTPATIENT
Start: 2024-05-23 | End: 2024-05-24 | Stop reason: HOSPADM

## 2024-05-23 RX ADMIN — Medication 1 TABLET: at 08:43

## 2024-05-23 RX ADMIN — AMLODIPINE BESYLATE 5 MG: 5 TABLET ORAL at 14:37

## 2024-05-23 RX ADMIN — CLONIDINE HYDROCHLORIDE 0.1 MG: 0.1 TABLET ORAL at 21:14

## 2024-05-23 RX ADMIN — DIAZEPAM 10 MG: 5 TABLET ORAL at 04:29

## 2024-05-23 RX ADMIN — THIAMINE HCL TAB 100 MG 100 MG: 100 TAB at 08:43

## 2024-05-23 RX ADMIN — SODIUM CHLORIDE: 9 INJECTION, SOLUTION INTRAVENOUS at 04:34

## 2024-05-23 RX ADMIN — CLONIDINE HYDROCHLORIDE 0.1 MG: 0.1 TABLET ORAL at 04:30

## 2024-05-23 RX ADMIN — SODIUM CHLORIDE: 9 INJECTION, SOLUTION INTRAVENOUS at 17:52

## 2024-05-23 RX ADMIN — FOLIC ACID 1 MG: 1 TABLET ORAL at 08:43

## 2024-05-23 RX ADMIN — CLONIDINE HYDROCHLORIDE 0.1 MG: 0.1 TABLET ORAL at 12:05

## 2024-05-23 ASSESSMENT — ACTIVITIES OF DAILY LIVING (ADL)
ADLS_ACUITY_SCORE: 29
ADLS_ACUITY_SCORE: 27
ADLS_ACUITY_SCORE: 29
ADLS_ACUITY_SCORE: 27
ADLS_ACUITY_SCORE: 29
ADLS_ACUITY_SCORE: 27
ADLS_ACUITY_SCORE: 29
ADLS_ACUITY_SCORE: 27
ADLS_ACUITY_SCORE: 29

## 2024-05-23 NOTE — PHARMACY-ADMISSION MEDICATION HISTORY
Pharmacy Intern Admission Medication History    Admission medication history is complete. The information provided in this note is only as accurate as the sources available at the time of the update.    Information Source(s): Patient and CareEverywhere/SureScripts via in-person    Pertinent Information: None    Changes made to PTA medication list:  Added: Centrum  Deleted: None  Changed: zocor frequency    Allergies reviewed with patient and updates made in EHR: yes    Medication History Completed By: Tia Pollock 5/22/2024 8:11 PM      Current Facility-Administered Medications for the 5/22/24 encounter (Hospital Encounter)   Medication    [COMPLETED] diphenhydrAMINE (BENADRYL) injection 50 mg    [COMPLETED] methylPREDNISolone sodium succinate (solu-MEDROL) injection 40 mg     PTA Med List   Medication Sig Last Dose    amLODIPine-benazepril (LOTREL) 2.5-10 MG capsule Take 1 capsule by mouth daily 5/21/2024 at PM    multivitamin (CENTRUM SILVER) tablet Take 1 tablet by mouth daily 5/21/2024 at PM    simvastatin (ZOCOR) 20 MG tablet Take 20 mg by mouth at bedtime 5/21/2024 at PM

## 2024-05-23 NOTE — ED NOTES
[facility]  ED Nurse Handoff Report    ED Chief complaint: Allergic Reaction      ED Diagnosis:   Final diagnoses:   Alcohol withdrawal seizure without complication (H)   Angioedema, initial encounter       Code Status: Full Code    Allergies: No Known Allergies    Patient Story:     Altaf Nixon is a 73 year old male who presents to the ED via EMS for evaluation of an allergic reaction. The EMS reports that the patient woke up this morning with redness and swelling that began on his cheek and later spread to his whole face. Adds that the patient went to get Benadryl at the pharmacy earlier today, where he was directed to go to . When at  the patient was given amlodipine, Benadryl, and solu-Medrol around 1700, and brought via EMS to the ED. When at  his heart rate was 125 beats per minute, and between 110-120 beats per minute en route. The patient reports that he typically drinks about four drinks per day.      Focused Assessment:    A+Ox 4  SBP's 140- 170's; Sinus Tachycardia  >90% RA  Generalized facial edema  Flushed appearance  Tremulous  Anxious  Pleasant    Treatments and/or interventions provided:   Plasma infusion  1 NS Bolus x2  100 MG Thiamine PO  Multivitamin 1 tab PO  5 MG Valium PO  0.3 MG EPi IM  62.5 Solu Medrol IVP  1 G TXA  10 MG Valium IVP x1    Patient's response to treatments and/or interventions:   Improved Tremor    To be done/followed up on inpatient unit:    Continue with plan of care    Does this patient have any cognitive concerns?:  None observed    Activity level - Baseline/Home:  Independent  Activity Level - Current:   Stand with Assist    Patient's Preferred language: English   Needed?: No    Isolation: None  Infection: Not Applicable  Patient tested for COVID 19 prior to admission: No  Bariatric?: No    Vital Signs:   Vitals:    05/22/24 1830 05/22/24 1839 05/22/24 1840 05/22/24 1900   BP: (!) 160/90  (!) 155/85 (!) 152/95   Pulse: 120  118 (!)  123   Resp: 17   25   Temp:  98.1  F (36.7  C)     TempSrc:       SpO2: 96%  97% 92%       Cardiac Rhythm:Cardiac Rhythm: Sinus tachycardia    Was the PSS-3 completed:   Yes  What interventions are required if any?               Family Comments: None at bedside.  OBS brochure/video discussed/provided to patient/family: N/A    For the majority of the shift this patient's behavior was Green.   Behavioral interventions performed were None.    ED NURSE PHONE NUMBER: *[phone]

## 2024-05-23 NOTE — ED NOTES
Report called to [facility].    Karen Mccallum RN,.......................................... 5/22/2024   10:24 PM

## 2024-05-23 NOTE — PLAN OF CARE
A&O X4, forgetful. CIWA 5,4,5,5.VSS on RA except HTN and Tachy, HR  at rest- 120-130s with activity.TELE ST.Lung Sounds clear.Bowel Sounds active, -BM, +flatus.Voiding freely. Up assist 1 gait belt unsteady due to tremors. Denies pain. Tolerating regular diet. NS infusing at 75ml/hr. Swelling to face and lips improving.

## 2024-05-23 NOTE — PROGRESS NOTES
2225h: ED to [facility] for angioedema & alcohol withdrawal.     Pt AO x4, anxious w/ tremors on both upper extremities w/ CIWA score of: 9-19, diazepam 10 mg PO given. Denies hallucination. + facial & lips swelling. Clear bilateral breath sound, denies dyspnea/SOB. O2Sat 94% on RA. Tolerates regular diet. -n/v.  mg/dl. Continent B/B. Voiding adequate amount to urinal. Abdomen round, soft to palpate. Normoactive BS x4. Last BM 05/22 as per pt. Assist of x1, can be unsteady due to tremors. No edema on BLE, sensation intact w/ palpable pulses.     On K, Mg Protocol; re-check in AM 05/23.  NS @ 75 ml/hr infusing in RU forearm.  PIV SL on R lower forearm.

## 2024-05-23 NOTE — H&P
[facility]    History and Physical - Hospitalist Service       Date of Admission:  5/22/2024    Assessment & Plan      Altaf Nixon is a 73 year old male admitted on 5/22/2024 with initial concerns for angioedema, but then did develop progressive concern for alcohol withdrawal.       Concern for possible alcohol withdrawal  Shaking in the context of alcohol use, per ED physician predates epinephrine administration  Received 15 mg of IV valium in the ED  Drinks 4 drinks a day and sounds agreeable to stop  Plan  - admit to inpatient given continued tachycardia, hypertension. Please note that these VS changes predate the administration of the epinephrine  - ciwa protocol with valium  - chem dep consult  - IV vitamins      Likely ACEI induced angioedema  P/w classic angioedema facies  Improving overall and no signs of airway compromise  Plan  - benazepril added to allergy list  - IMC status for close monitoring overnight    Hypertension  Plan  - continue amlodipine          Diet:  regular  DVT Prophylaxis: Low Risk/Ambulatory with no VTE prophylaxis indicated  Guy Catheter: Not present  Lines: None     Cardiac Monitoring: None  Code Status:  full code    Clinically Significant Risk Factors Present on Admission             # Anion Gap Metabolic Acidosis: Highest Anion Gap = 20 mmol/L in last 2 days, will monitor and treat as appropriate      # Hypertension: Home medication list includes antihypertensive(s)                 Disposition Plan     Medically Ready for Discharge: Anticipated in 2-4 Days           Lázaro Kathleen DO  Hospitalist Service  [facility]  Securely message with [employer] (more info)  Text page via [employer] Paging/Directory     ______________________________________________________________________    Chief Complaint   Face swelling    History is obtained from the patient    History of Present Illness   Altaf Nixon is a 73 year old male with  pmh of alcohol use who was sent by  to the ED today given concern for an allergic reaction. He woke with redness and swelling of his lips that spread to his whole face. He ate a leftover taco that was left out after last night.  He went to the pharmacy for some benadryl who sent him to , where he received benadryl and solumedrol and then sent via EMS to the ED.     In this ED he received IV epinephrine, solumedrol. In cosideration of the possible bradykinin induced reaction to his ARB he received TXA and also transfused plasma.      Past Medical History    History reviewed. No pertinent past medical history.    Past Surgical History   History reviewed. No pertinent surgical history.    Prior to Admission Medications   Prior to Admission Medications   Prescriptions Last Dose Informant Patient Reported? Taking?   amLODIPine-benazepril (LOTREL) 2.5-10 MG capsule   Yes No   Sig: Take 1 capsule by mouth daily   simvastatin (ZOCOR) 20 MG tablet   Yes No   Si mg      Facility-Administered Medications Last Administration Doses Remaining   diphenhydrAMINE (BENADRYL) injection 50 mg 2024  5:04 PM 0   methylPREDNISolone sodium succinate (solu-MEDROL) injection 40 mg 2024  5:08 PM 0           Review of Systems    The 10 point Review of Systems is negative other than noted in the HPI or here.     Social History   I have reviewed this patient's social history and updated it with pertinent information if needed.  Social History     Tobacco Use    Smoking status: Never    Smokeless tobacco: Never         Family History     No significant family history, including no history of:       Allergies   Allergies   Allergen Reactions    Benazepril Angioedema        Physical Exam   Vital Signs: Temp: 98.1  F (36.7  C) Temp src: Oral BP: (!) 152/95 Pulse: (!) 123   Resp: 25 SpO2: 92 % O2 Device: None (Room air)    Weight: 0 lbs 0 oz    General Appearance: NAD. Has upper lip swelling and erythema of the face. No tongue  sweling. No stridor or difficulty handling secretions  Respiratory: CTAB, wob wnml  Cardiovascular: RRR, no murmurs  GI: soft, nontender and not distended. Bowel sounds are normal    Medical Decision Making       60 MINUTES SPENT BY ME on the date of service doing chart review, history, exam, documentation & further activities per the note.      Data     I have personally reviewed the following data over the past 24 hrs:    5.7  \   15.6   / 238     131 (L) 93 (L) 7.0 (L) /  101 (H)   3.9 18 (L) 0.91 \     ALT: 90 (H) AST: 126 (H) AP: 96 TBILI: 0.8   ALB: 4.5 TOT PROTEIN: 8.3 LIPASE: N/A     TSH: 3.88 T4: N/A A1C: N/A       Imaging results reviewed over the past 24 hrs:   No results found for this or any previous visit (from the past 24 hour(s)).

## 2024-05-23 NOTE — PROVIDER NOTIFICATION
MD Notification    Notified Person: MD    Notified Person Name: Dr. Dan    Notification Date/Time: 1419    Notification Interaction: vocera    Purpose of Notification:  pt just up moving very tachy -130s /112    Orders Received:    Comments:

## 2024-05-23 NOTE — PROVIDER NOTIFICATION
MD Notification    Notified Person: MD    Notified Person Name: Dr. Dan    Notification Date/Time: 1405    Notification Interaction: vocera    Purpose of Notification: pt ciwa 5,4,5 only scoring for tremors, had pt up and walking in room still a little shakey when first getting up he is hoping to still discharge.     Orders Received:    Comments:

## 2024-05-23 NOTE — PROGRESS NOTES
[facility]    Medicine Progress Note - Hospitalist Service    Date of Admission:  5/22/2024    Assessment & Plan       Altaf Nixon is a 73 year old male admitted on 5/22/2024 with initial concerns for angioedema, but then did develop progressive concern for alcohol withdrawal and so was admitted for further evaluation.       Likely ACEI induced angioedema  P/w classic angioedema facies at presentation.  Received epinephrine, Solu-Medrol, TXA and plasma transfusion  Improving overall and no signs of airway compromise.  -Patient reported feeling back to baseline  -PTA benazepril listed as his allergy  -Outpatient referral to allergy specialist done     Concern for possible alcohol withdrawal  Patient started having tremors while in the ED.  Received 15 mg of IV valium in the ED and then 10 mg oral Valium up until this morning around 4:30 AM.  Since then he has been having CIWA scores of around 4-5 and is oriented  -Patient refused CD counselor evaluation     Hypertension  PTA on amlodipine/benazepril combination.  Benazepril added as allergy given angioedema  -Amlodipine dose increased from 2.5 mg PTA dose to 5 mg  -Patient currently on low-dose scheduled clonidine as a part of alcohol withdrawal program  -As patient is also tachycardic will add as needed labetalol for SBP more than 180  -Monitor and adjust medication as needed    Hyponatremia  -Mild  -Monitor    Sinus tachycardia  -Likely part of alcohol withdrawal, patient however asymptomatic  -Continue to monitor in telemetry    Elevated AST, mild  -Likely from alcohol use disorder, improving    Alcoholic ketosis  -Mild, will monitor     Diet: Combination Diet Regular Diet Adult    DVT Prophylaxis: Pneumatic Compression Devices  Guy Catheter: Not present  Lines: None     Cardiac Monitoring: ACTIVE order. Indication: angioedema  Code Status: Full Code      Clinically Significant Risk Factors Present on Admission             # Anion  "Gap Metabolic Acidosis: Highest Anion Gap = 20 mmol/L in last 2 days, will monitor and treat as appropriate      # Hypertension: Home medication list includes antihypertensive(s)      # Obesity: Estimated body mass index is 30.45 kg/m  as calculated from the following:    Height as of this encounter: 1.702 m (5' 7\").    Weight as of this encounter: 88.2 kg (194 lb 7.1 oz).              Disposition Plan     Medically Ready for Discharge: Anticipated Tomorrow             Ping Dan MD  Hospitalist Service  [facility]  Securely message with [employer] (more info)  Text page via [facility] Paging/Directory   ______________________________________________________________________    Interval History   Care assumed, chart reviewed.  Patient expressed desire to go home.    Physical Exam   Vital Signs: Temp: 97.7  F (36.5  C) Temp src: Axillary BP: 138/85 Pulse: 74   Resp: 18 SpO2: 92 % O2 Device: None (Room air)    Weight: 194 lbs 7.13 oz    Exam:  Constitutional: Awake, alert and no distress. Appears comfortable, some tremors present, angiodema seems improving  Head: Normocephalic. No masses, lesions, tenderness or abnormalities  ENMT: ENT exam normal, no neck nodes or sinus tenderness  Cardiovascular: Tachycardic but regular, no murmurs, no rubs or JVD  Respiratory: Normal WOB,b/l equal air entry, no wheezes or crackles   Gastrointestinal: Abdomen soft, non-tender. BS normal. No masses, organomegaly  : Deferred  Extremities : No edema , no clubbing or cyanosis      Medical Decision Making       53 MINUTES SPENT BY ME on the date of service doing chart review, history, exam, documentation & further activities per the note.      Data     I have personally reviewed the following data over the past 24 hrs:    2.6 (L)  \   13.2 (L)   / 192     133 (L) 98 8.9 /  170 (H)   4.3 23 0.77 \     ALT: 60 AST: 67 (H) AP: 75 TBILI: 0.7   ALB: 3.9 TOT PROTEIN: 7.0 LIPASE: N/A     TSH: 3.88 T4: N/A A1C: N/A     "   Imaging results reviewed over the past 24 hrs:   No results found for this or any previous visit (from the past 24 hour(s)).  Recent Labs   Lab 05/23/24  0548 05/22/24  2249 05/22/24  1736   WBC 2.6*  --  5.7   HGB 13.2*  --  15.6   MCV 92  --  93     --  238   *  --  131*   POTASSIUM 4.3  --  3.9   CHLORIDE 98  --  93*   CO2 23  --  18*   BUN 8.9  --  7.0*   CR 0.77  --  0.91   ANIONGAP 12  --  20*   SAMIR 8.7*  --  9.7   * 162* 101*   ALBUMIN 3.9  --  4.5   PROTTOTAL 7.0  --  8.3   BILITOTAL 0.7  --  0.8   ALKPHOS 75  --  96   ALT 60  --  90*   AST 67*  --  126*

## 2024-05-24 VITALS
HEIGHT: 67 IN | OXYGEN SATURATION: 95 % | RESPIRATION RATE: 17 BRPM | WEIGHT: 194.45 LBS | SYSTOLIC BLOOD PRESSURE: 160 MMHG | BODY MASS INDEX: 30.52 KG/M2 | TEMPERATURE: 97.4 F | HEART RATE: 92 BPM | DIASTOLIC BLOOD PRESSURE: 99 MMHG

## 2024-05-24 LAB
ANION GAP SERPL CALCULATED.3IONS-SCNC: 9 MMOL/L (ref 7–15)
B-OH-BUTYR SERPL-SCNC: <0.18 MMOL/L
BASOPHILS # BLD AUTO: 0 10E3/UL (ref 0–0.2)
BASOPHILS NFR BLD AUTO: 0 %
BUN SERPL-MCNC: 12.8 MG/DL (ref 8–23)
CALCIUM SERPL-MCNC: 8.8 MG/DL (ref 8.8–10.2)
CHLORIDE SERPL-SCNC: 102 MMOL/L (ref 98–107)
CREAT SERPL-MCNC: 0.9 MG/DL (ref 0.67–1.17)
DEPRECATED HCO3 PLAS-SCNC: 24 MMOL/L (ref 22–29)
EGFRCR SERPLBLD CKD-EPI 2021: >90 ML/MIN/1.73M2
EOSINOPHIL # BLD AUTO: 0 10E3/UL (ref 0–0.7)
EOSINOPHIL NFR BLD AUTO: 0 %
ERYTHROCYTE [DISTWIDTH] IN BLOOD BY AUTOMATED COUNT: 13.5 % (ref 10–15)
GLUCOSE SERPL-MCNC: 93 MG/DL (ref 70–99)
HCT VFR BLD AUTO: 40.3 % (ref 40–53)
HGB BLD-MCNC: 13.3 G/DL (ref 13.3–17.7)
IMM GRANULOCYTES # BLD: 0 10E3/UL
IMM GRANULOCYTES NFR BLD: 0 %
LYMPHOCYTES # BLD AUTO: 0.6 10E3/UL (ref 0.8–5.3)
LYMPHOCYTES NFR BLD AUTO: 11 %
MAGNESIUM SERPL-MCNC: 2 MG/DL (ref 1.7–2.3)
MCH RBC QN AUTO: 30.9 PG (ref 26.5–33)
MCHC RBC AUTO-ENTMCNC: 33 G/DL (ref 31.5–36.5)
MCV RBC AUTO: 94 FL (ref 78–100)
MONOCYTES # BLD AUTO: 0.6 10E3/UL (ref 0–1.3)
MONOCYTES NFR BLD AUTO: 11 %
NEUTROPHILS # BLD AUTO: 4.3 10E3/UL (ref 1.6–8.3)
NEUTROPHILS NFR BLD AUTO: 78 %
NRBC # BLD AUTO: 0 10E3/UL
NRBC BLD AUTO-RTO: 0 /100
PLATELET # BLD AUTO: 221 10E3/UL (ref 150–450)
POTASSIUM SERPL-SCNC: 3.9 MMOL/L (ref 3.4–5.3)
RBC # BLD AUTO: 4.31 10E6/UL (ref 4.4–5.9)
SODIUM SERPL-SCNC: 135 MMOL/L (ref 135–145)
WBC # BLD AUTO: 5.5 10E3/UL (ref 4–11)

## 2024-05-24 PROCEDURE — 83735 ASSAY OF MAGNESIUM: CPT | Performed by: INTERNAL MEDICINE

## 2024-05-24 PROCEDURE — 80048 BASIC METABOLIC PNL TOTAL CA: CPT | Performed by: INTERNAL MEDICINE

## 2024-05-24 PROCEDURE — 85025 COMPLETE CBC W/AUTO DIFF WBC: CPT | Performed by: INTERNAL MEDICINE

## 2024-05-24 PROCEDURE — 258N000003 HC RX IP 258 OP 636: Performed by: HOSPITALIST

## 2024-05-24 PROCEDURE — 36415 COLL VENOUS BLD VENIPUNCTURE: CPT | Performed by: INTERNAL MEDICINE

## 2024-05-24 PROCEDURE — 250N000013 HC RX MED GY IP 250 OP 250 PS 637: Performed by: INTERNAL MEDICINE

## 2024-05-24 PROCEDURE — 250N000013 HC RX MED GY IP 250 OP 250 PS 637: Performed by: HOSPITALIST

## 2024-05-24 PROCEDURE — 82010 KETONE BODYS QUAN: CPT | Performed by: INTERNAL MEDICINE

## 2024-05-24 PROCEDURE — 99239 HOSP IP/OBS DSCHRG MGMT >30: CPT | Performed by: INTERNAL MEDICINE

## 2024-05-24 RX ORDER — AMLODIPINE BESYLATE 10 MG/1
10 TABLET ORAL DAILY
Qty: 30 TABLET | Refills: 0 | Status: SHIPPED | OUTPATIENT
Start: 2024-05-24

## 2024-05-24 RX ORDER — AMLODIPINE BESYLATE 10 MG/1
10 TABLET ORAL DAILY
Status: DISCONTINUED | OUTPATIENT
Start: 2024-05-24 | End: 2024-05-24 | Stop reason: HOSPADM

## 2024-05-24 RX ADMIN — Medication 1 TABLET: at 09:02

## 2024-05-24 RX ADMIN — SODIUM CHLORIDE: 9 INJECTION, SOLUTION INTRAVENOUS at 06:43

## 2024-05-24 RX ADMIN — FOLIC ACID 1 MG: 1 TABLET ORAL at 09:02

## 2024-05-24 RX ADMIN — AMLODIPINE BESYLATE 10 MG: 10 TABLET ORAL at 09:02

## 2024-05-24 RX ADMIN — THIAMINE HCL TAB 100 MG 100 MG: 100 TAB at 09:02

## 2024-05-24 RX ADMIN — CLONIDINE HYDROCHLORIDE 0.1 MG: 0.1 TABLET ORAL at 12:21

## 2024-05-24 RX ADMIN — CLONIDINE HYDROCHLORIDE 0.1 MG: 0.1 TABLET ORAL at 04:09

## 2024-05-24 ASSESSMENT — ACTIVITIES OF DAILY LIVING (ADL)
ADLS_ACUITY_SCORE: 26
ADLS_ACUITY_SCORE: 29
ADLS_ACUITY_SCORE: 29
ADLS_ACUITY_SCORE: 26
ADLS_ACUITY_SCORE: 29
ADLS_ACUITY_SCORE: 26
ADLS_ACUITY_SCORE: 29
ADLS_ACUITY_SCORE: 26
ADLS_ACUITY_SCORE: 29
ADLS_ACUITY_SCORE: 26
ADLS_ACUITY_SCORE: 26

## 2024-05-24 NOTE — PROGRESS NOTES
Reason for Admission: ACEI induced angioedema and possible alcohol withdrawal   Cognitive/Mentation: A/Ox 4 forgetful  Neuros/CMS: Stable w/ bilateral extremity tremors  VS: VSS on RA.   Tele: SR with BBB.  GI: BS clear, Continent.  : Continent.  Pulmonary: LS clear.  Pain: denies any pain .   Drains/Lines: PIV infusing NS @75ml\hr  Skin: intact chest and face appears flushed  Activity: Assist x 1 with GB, can be unsteady due to tremors.  Diet: regular with thin liquids. Takes pills whole.   Discharge: anticipated today   Aggression Stoplight Tool: green   End of shift summary: patient scored 4,5,4 on CIWA

## 2024-05-24 NOTE — DISCHARGE SUMMARY
"[facility]  Hospitalist Discharge Summary      Date of Admission:  5/22/2024  Date of Discharge:  5/24/2024  Discharging Provider: Ping Dan MD  Discharge Service: Hospitalist Service    Discharge Diagnoses   Likely ACE inhibitor induced angioedema  Alcohol withdrawal  Hypertension  Sinus tachycardia  Hyponatremia, mild  Elevated AST    Clinically Significant Risk Factors     # Obesity: Estimated body mass index is 30.45 kg/m  as calculated from the following:    Height as of this encounter: 1.702 m (5' 7\").    Weight as of this encounter: 88.2 kg (194 lb 7.1 oz).       Follow-ups Needed After Discharge   Follow-up Appointments     Follow-up and recommended labs and tests       Follow up with primary care provider, Vitor Rueda, within 7 days for   hospital follow- up.  LFTs through PCP in 2 to 3 days  -Your simvastatin was held at discharge due to slightly elevated LFTs   likely from alcohol use and also increasing dose of your amlodipine.    Discuss with PCP before restarting it          Unresulted Labs Ordered in the Past 30 Days of this Admission       No orders found from 4/22/2024 to 5/23/2024.          Discharge Disposition   Discharged to home  Condition at discharge: Stable    Hospital Course       Altaf Nixon is a 73 year old male admitted on 5/22/2024 with initial concerns for angioedema, but then did develop progressive concern for alcohol withdrawal and so was admitted for further evaluation.       Likely ACEI induced angioedema  P/w classic angioedema facies at presentation.  Received epinephrine, Solu-Medrol, TXA and plasma transfusion  Improving overall and no signs of airway compromise.  -Patient reported feeling back to baseline  -PTA benazepril listed as his allergy  -Outpatient referral to allergy specialist done     Concern for possible alcohol withdrawal  Patient started having tremors while in the ED.  Received 15 mg of IV valium in the ED and then 10 mg " oral Valium up until this morning around 4:30 AM.  Since then he has been having CIWA scores of around 4-5 and is oriented  -Patient refused CD counselor evaluation  -Patient did not need any benzodiazepine for more than 24 hours prior to discharge     Hypertension  PTA on amlodipine/benazepril combination.  Benazepril added as allergy given angioedema  -Amlodipine dose increased from 2.5 mg PTA dose to 10 mg  -Patient needs to monitor his blood pressure and follow-up with PCP for any medication adjustment    Hyponatremia  -Mild, resolved    Sinus tachycardia  -Likely part of alcohol withdrawal  -Heart rate in high 90s and low 100s prior to discharge but patient continued to remain asymptomatic even with activity  -Outpatient follow-up    Elevated AST, mild  -Likely from alcohol use disorder, improving  -Outpatient follow-up    Alcoholic ketosis  -Mild, resolved     Consultations This Hospital Stay   PHYSICAL THERAPY ADULT IP CONSULT    Code Status   Full Code    Time Spent on this Encounter   I, Ping Dan MD, personally saw the patient today and spent greater than 30 minutes discharging this patient.       Ping Dan MD  [place] [address] [place] [address]  Phone: [phone]  ______________________________________________________________________    Physical Exam   Vital Signs: Temp: 97.4  F (36.3  C) Temp src: Oral BP: (!) 160/99 Pulse: 92   Resp: 17 SpO2: 95 % O2 Device: None (Room air)    Weight: 194 lbs 7.13 oz  Exam:  Constitutional: Awake, alert and no distress. Appears comfortable  Head: Normocephalic. No masses, lesions, tenderness or abnormalities  ENMT: ENT exam normal, no neck nodes or sinus tenderness  Cardiovascular: Tachycardic but regular, no murmurs, no rubs or JVD  Respiratory: Normal WOB,b/l equal air entry, no wheezes or crackles   Gastrointestinal: Abdomen soft, non-tender. BS normal. No masses, organomegaly  : Deferred  Extremities : No  edema , no clubbing or cyanosis         Primary Care Physician   Vitor Rueda    Discharge Orders      Adult Allergy/Asthma  Referral      Reason for your hospital stay    Angioedema, alcohol withdrawal     Activity    Your activity upon discharge: activity as tolerated     Monitor and record    blood pressure daily and readings to PCP for any medication adjustment     Follow-up and recommended labs and tests     Follow up with primary care provider, Vitor Rueda, within 7 days for hospital follow- up.  LFTs through PCP in 2 to 3 days  -Your simvastatin was held at discharge due to slightly elevated LFTs likely from alcohol use and also increasing dose of your amlodipine.  Discuss with PCP before restarting it     Diet    Follow this diet upon discharge: Orders Placed This Encounter      Combination Diet Regular Diet Adult       Significant Results and Procedures   Results for orders placed or performed during the hospital encounter of 03/19/24   CT Head w/o Contrast    Narrative    EXAM: CT HEAD W/O CONTRAST  LOCATION: [facility]  DATE: 3/19/2024    INDICATION: episode of confusion, etoh  COMPARISON: None.  TECHNIQUE: Routine CT Head without IV contrast. Multiplanar reformats. Dose reduction techniques were used.    FINDINGS:  INTRACRANIAL CONTENTS: No intracranial hemorrhage, extraaxial collection, or mass effect.  No CT evidence of acute infarct. Mild presumed chronic small vessel ischemic changes. Mild generalized volume loss. No hydrocephalus.     VISUALIZED ORBITS/SINUSES/MASTOIDS: No intraorbital abnormality. No paranasal sinus mucosal disease. No middle ear or mastoid effusion.    BONES/SOFT TISSUES: No acute abnormality.      Impression    IMPRESSION:  1.  No acute intracranial process.       Discharge Medications   Current Discharge Medication List        START taking these medications    Details   amLODIPine (NORVASC) 10 MG tablet Take 1 tablet (10 mg) by mouth  daily  Qty: 30 tablet, Refills: 0    Comments: Future refills by PCP Dr. Vitor Rueda with phone number [phone].  Associated Diagnoses: Benign essential hypertension           CONTINUE these medications which have NOT CHANGED    Details   multivitamin (CENTRUM SILVER) tablet Take 1 tablet by mouth daily           STOP taking these medications       amLODIPine-benazepril (LOTREL) 2.5-10 MG capsule Comments:   Reason for Stopping:         simvastatin (ZOCOR) 20 MG tablet Comments:   Reason for Stopping:             Allergies   Allergies   Allergen Reactions    Benazepril Angioedema

## 2024-05-24 NOTE — PROGRESS NOTES
Discharge education provided to pt. Pt understands follow ups, when to call, and medication regimen. Questions answered. Discharge medication given to pt to take home, aware that refills will go thru PCP. Pt discharging home with family

## 2024-08-02 ENCOUNTER — OFFICE VISIT (OUTPATIENT)
Dept: URGENT CARE | Facility: URGENT CARE | Age: 74
End: 2024-08-02
Payer: COMMERCIAL

## 2024-08-02 VITALS
OXYGEN SATURATION: 100 % | DIASTOLIC BLOOD PRESSURE: 96 MMHG | HEART RATE: 95 BPM | SYSTOLIC BLOOD PRESSURE: 154 MMHG | TEMPERATURE: 98.3 F

## 2024-08-02 DIAGNOSIS — H00.011 HORDEOLUM EXTERNUM OF RIGHT UPPER EYELID: Primary | ICD-10-CM

## 2024-08-02 PROCEDURE — 99213 OFFICE O/P EST LOW 20 MIN: CPT | Performed by: FAMILY MEDICINE

## 2024-08-02 RX ORDER — ERYTHROMYCIN 5 MG/G
0.5 OINTMENT OPHTHALMIC 3 TIMES DAILY
Qty: 3.5 G | Refills: 0 | Status: SHIPPED | OUTPATIENT
Start: 2024-08-02 | End: 2024-08-02

## 2024-08-02 RX ORDER — ERYTHROMYCIN 5 MG/G
0.5 OINTMENT OPHTHALMIC 3 TIMES DAILY
Qty: 3.5 G | Refills: 0 | Status: SHIPPED | OUTPATIENT
Start: 2024-08-02 | End: 2024-08-09

## 2024-08-02 RX ORDER — SIMVASTATIN 20 MG
20 TABLET ORAL AT BEDTIME
COMMUNITY
Start: 2024-07-19

## 2024-08-02 NOTE — PROGRESS NOTES
EPICSPMNEYESUBJECTIVE:Chief Complaint:   Chief Complaint   Patient presents with    Eye Problem     IRRITATION IN RIGHT EYE - RED AND ITCHY      History of Present Illness: Altaf Nixon is a 74 year old male who presents complaining of tender bump on eyelid for few days.  Onset/timing: gradual.   Associated Signs and Symptoms: discomfort   Treatment measures tried include: none   Contact wearer : No    No past medical history on file.  Allergies   Allergen Reactions    Amlodipine Swelling    Benazepril Angioedema    Lisinopril Cough     Social History     Tobacco Use    Smoking status: Never    Smokeless tobacco: Never   Substance Use Topics    Alcohol use: Not on file       ROS:  no fevers  no photophobia, vision change  SKIN: no eythema    OBJECTIVE:  BP (!) 154/96   Pulse 95   Temp 98.3  F (36.8  C) (Tympanic)   SpO2 100%    General: no acute distress  Eye exam: tender enlarged lump eyelid.  PERRLA, no photophobia, conjunctiva clear      ICD-10-CM    1. Hordeolum externum of right upper eyelid  H00.011 erythromycin (ROMYCIN) 5 MG/GM ophthalmic ointment          warms packs, f/u Opthalmology if persists as some sty's need I&D.

## 2024-10-21 ENCOUNTER — OFFICE VISIT (OUTPATIENT)
Dept: ALLERGY | Facility: CLINIC | Age: 74
End: 2024-10-21
Attending: INTERNAL MEDICINE
Payer: COMMERCIAL

## 2024-10-21 VITALS
SYSTOLIC BLOOD PRESSURE: 156 MMHG | BODY MASS INDEX: 29.29 KG/M2 | OXYGEN SATURATION: 96 % | DIASTOLIC BLOOD PRESSURE: 94 MMHG | WEIGHT: 187 LBS | HEART RATE: 100 BPM

## 2024-10-21 DIAGNOSIS — T78.3XXA ANGIOEDEMA, INITIAL ENCOUNTER: ICD-10-CM

## 2024-10-21 PROCEDURE — 99203 OFFICE O/P NEW LOW 30 MIN: CPT | Performed by: INTERNAL MEDICINE

## 2024-10-21 NOTE — LETTER
10/21/2024      Altaf Nixon  [address] [facility] [id number]      Dear Colleague,    Thank you for referring your patient, Altaf Nixon, to the [facility] [place]. Please see a copy of my visit note below.    Altaf Nixon was seen in the Allergy Clinic at [facility].    Altaf Nixon is a 74 year old male being seen today at the request of Ping Dan MD/[facility] in consultation for Angioedema, initial encounter.    He was admitted on May 22 through May 24 for facial angioedema.  The patient has concerns that it was related to a taco but the notes describe how he was taking benazepril.  Benazepril is an ACE inhibitor which has known causes of angioedema and was stopped after the hospitalization.  He is currently not on any blood pressure medications and his blood pressure has been well-controlled per his primary care provider.  Today his BP is elevated in our clinic.    He was treated with epinephrine, Solu-Medrol, TXA, plasma for the angioedema.  He did not require intubation.  He has been avoiding the talk was ever since.    Lisinopril caused a cough.  Amlodipine caused lower leg swelling.      No past medical history on file.  No family history on file.  No past surgical history on file.    ENVIRONMENTAL HISTORY:   Pets inside the house include None.  Do you smoke cigarettes or other recreational drugs? No There is/are 0 smokers living in the house. The house does not have a damp basement.     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 He lives with his brother.      Review of Systems      Current Outpatient Medications:      multivitamin (CENTRUM SILVER) tablet, Take 1 tablet by mouth daily, Disp: , Rfl:      amLODIPine (NORVASC) 10 MG tablet, Take 1 tablet (10 mg) by mouth daily (Patient not taking: Reported on 8/2/2024), Disp: 30 tablet, Rfl: 0     simvastatin (ZOCOR) 20 MG tablet, Take  20 mg by mouth at bedtime (Patient not taking: Reported on 10/21/2024), Disp: , Rfl:   Allergies   Allergen Reactions     Amlodipine Swelling     Benazepril Angioedema     Lisinopril Cough         EXAM:   BP (!) 156/94   Pulse 100   Wt 84.8 kg (187 lb)   SpO2 96%   BMI 29.29 kg/m      Physical Exam    Constitutional:       General: He is not in acute distress.     Appearance: Normal appearance. He is not ill-appearing.   HENT:      Head: Normocephalic and atraumatic.     Skin:     General: Skin is warm.      Findings: No erythema or rash.   Neurological:      General: No focal deficit present.      Mental Status: He is alert. Mental status is at baseline.   Psychiatric:         Mood and Affect: Mood normal.         Behavior: Behavior normal.      ASSESSMENT/PLAN:  Altaf Nixon is a 74 year old male seen today for evaluation of angioedema which by history is consistent with an ACE inhibitor induced angioedema.  I do not feel it is related to the beef taco.  He has had beef multiple times since then without any issues.  I suggested he could try beef tacos again based on his history.  He should remain off ACE inhibitor's based on his history.  I do not have a blood test or skin test I can perform to prove it was the ACE inhibitor.    His blood pressure is elevated today.  Recommended to have this further evaluated which his primary is at [facility] and the blood pressure was normal at the [facility] system pain notes reviewed.    Thank you for allowing me to participate in the care of Altaf Nixon.      I spent 30 minutes on the date of the encounter doing chart review, history and exam, documentation and further coordination as noted above exclusive of separately reported interpretations    Luis Muller MD  Allergy/Immunology  [facility]      Again, thank you for allowing me to participate in the care of your patient.        Sincerely,        Luis Muller MD

## 2024-10-21 NOTE — PROGRESS NOTES
Altaf Nixon was seen in the Allergy Clinic at [facility].    Altaf Nixon is a 74 year old male being seen today at the request of Ping Dan MD/[facility] in consultation for Angioedema, initial encounter.    He was admitted on May 22 through May 24 for facial angioedema.  The patient has concerns that it was related to a taco but the notes describe how he was taking benazepril.  Benazepril is an ACE inhibitor which has known causes of angioedema and was stopped after the hospitalization.  He is currently not on any blood pressure medications and his blood pressure has been well-controlled per his primary care provider.  Today his BP is elevated in our clinic.    He was treated with epinephrine, Solu-Medrol, TXA, plasma for the angioedema.  He did not require intubation.  He has been avoiding the talk was ever since.    Lisinopril caused a cough.  Amlodipine caused lower leg swelling.      No past medical history on file.  No family history on file.  No past surgical history on file.    ENVIRONMENTAL HISTORY:   Pets inside the house include None.  Do you smoke cigarettes or other recreational drugs? No There is/are 0 smokers living in the house. The house does not have a damp basement.     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 He lives with his brother.      Review of Systems      Current Outpatient Medications:     multivitamin (CENTRUM SILVER) tablet, Take 1 tablet by mouth daily, Disp: , Rfl:     amLODIPine (NORVASC) 10 MG tablet, Take 1 tablet (10 mg) by mouth daily (Patient not taking: Reported on 8/2/2024), Disp: 30 tablet, Rfl: 0    simvastatin (ZOCOR) 20 MG tablet, Take 20 mg by mouth at bedtime (Patient not taking: Reported on 10/21/2024), Disp: , Rfl:   Allergies   Allergen Reactions    Amlodipine Swelling    Benazepril Angioedema    Lisinopril Cough         EXAM:   BP (!) 156/94   Pulse 100   Wt 84.8 kg (187  lb)   SpO2 96%   BMI 29.29 kg/m      Physical Exam    Constitutional:       General: He is not in acute distress.     Appearance: Normal appearance. He is not ill-appearing.   HENT:      Head: Normocephalic and atraumatic.     Skin:     General: Skin is warm.      Findings: No erythema or rash.   Neurological:      General: No focal deficit present.      Mental Status: He is alert. Mental status is at baseline.   Psychiatric:         Mood and Affect: Mood normal.         Behavior: Behavior normal.      ASSESSMENT/PLAN:  Altaf Nixon is a 74 year old male seen today for evaluation of angioedema which by history is consistent with an ACE inhibitor induced angioedema.  I do not feel it is related to the beef taco.  He has had beef multiple times since then without any issues.  I suggested he could try beef tacos again based on his history.  He should remain off ACE inhibitor's based on his history.  I do not have a blood test or skin test I can perform to prove it was the ACE inhibitor.    His blood pressure is elevated today.  Recommended to have this further evaluated which his primary is at [facility] and the blood pressure was normal at the [facility] system pain notes reviewed.    Thank you for allowing me to participate in the care of Altaf Nixon.      I spent 30 minutes on the date of the encounter doing chart review, history and exam, documentation and further coordination as noted above exclusive of separately reported interpretations    Luis Muller MD  Allergy/Immunology  [facility]